# Patient Record
Sex: MALE | Race: WHITE | Employment: FULL TIME
[De-identification: names, ages, dates, MRNs, and addresses within clinical notes are randomized per-mention and may not be internally consistent; named-entity substitution may affect disease eponyms.]

---

## 2019-12-08 ENCOUNTER — HEALTH MAINTENANCE LETTER (OUTPATIENT)
Age: 34
End: 2019-12-08

## 2021-01-10 ENCOUNTER — HEALTH MAINTENANCE LETTER (OUTPATIENT)
Age: 36
End: 2021-01-10

## 2021-03-23 ENCOUNTER — AMBULATORY - HEALTHEAST (OUTPATIENT)
Dept: NURSING | Facility: CLINIC | Age: 36
End: 2021-03-23

## 2021-04-13 ENCOUNTER — AMBULATORY - HEALTHEAST (OUTPATIENT)
Dept: NURSING | Facility: CLINIC | Age: 36
End: 2021-04-13

## 2021-04-15 ENCOUNTER — HOSPITAL ENCOUNTER (EMERGENCY)
Facility: CLINIC | Age: 36
Discharge: HOME | End: 2021-04-15
Attending: PHYSICIAN ASSISTANT | Admitting: PHYSICIAN ASSISTANT
Payer: COMMERCIAL

## 2021-04-15 ENCOUNTER — OFFICE VISIT (OUTPATIENT)
Dept: INTERNAL MEDICINE | Facility: CLINIC | Age: 36
End: 2021-04-15
Payer: COMMERCIAL

## 2021-04-15 ENCOUNTER — APPOINTMENT (OUTPATIENT)
Dept: CT IMAGING | Facility: CLINIC | Age: 36
End: 2021-04-15
Attending: PHYSICIAN ASSISTANT
Payer: COMMERCIAL

## 2021-04-15 VITALS
HEART RATE: 56 BPM | BODY MASS INDEX: 27.28 KG/M2 | HEIGHT: 68 IN | RESPIRATION RATE: 18 BRPM | SYSTOLIC BLOOD PRESSURE: 136 MMHG | WEIGHT: 180 LBS | TEMPERATURE: 97 F | OXYGEN SATURATION: 99 % | DIASTOLIC BLOOD PRESSURE: 83 MMHG

## 2021-04-15 VITALS
OXYGEN SATURATION: 98 % | RESPIRATION RATE: 14 BRPM | HEART RATE: 62 BPM | WEIGHT: 186 LBS | BODY MASS INDEX: 28.19 KG/M2 | TEMPERATURE: 97.9 F | HEIGHT: 68 IN | SYSTOLIC BLOOD PRESSURE: 122 MMHG | DIASTOLIC BLOOD PRESSURE: 80 MMHG

## 2021-04-15 DIAGNOSIS — R42 LIGHTHEADEDNESS: ICD-10-CM

## 2021-04-15 DIAGNOSIS — R01.1 HEART MURMUR: ICD-10-CM

## 2021-04-15 DIAGNOSIS — R51.9 NONINTRACTABLE HEADACHE, UNSPECIFIED CHRONICITY PATTERN, UNSPECIFIED HEADACHE TYPE: Primary | ICD-10-CM

## 2021-04-15 DIAGNOSIS — R51.9 HEADACHE: ICD-10-CM

## 2021-04-15 LAB
ANION GAP SERPL CALCULATED.3IONS-SCNC: 1 MMOL/L (ref 3–14)
BASOPHILS # BLD AUTO: 0 10E9/L (ref 0–0.2)
BASOPHILS NFR BLD AUTO: 0.5 %
BUN SERPL-MCNC: 13 MG/DL (ref 7–30)
CALCIUM SERPL-MCNC: 9.2 MG/DL (ref 8.5–10.1)
CHLORIDE SERPL-SCNC: 105 MMOL/L (ref 94–109)
CO2 SERPL-SCNC: 31 MMOL/L (ref 20–32)
CREAT SERPL-MCNC: 0.87 MG/DL (ref 0.66–1.25)
DIFFERENTIAL METHOD BLD: NORMAL
EOSINOPHIL # BLD AUTO: 0.1 10E9/L (ref 0–0.7)
EOSINOPHIL NFR BLD AUTO: 1 %
ERYTHROCYTE [DISTWIDTH] IN BLOOD BY AUTOMATED COUNT: 12.8 % (ref 10–15)
GFR SERPL CREATININE-BSD FRML MDRD: >90 ML/MIN/{1.73_M2}
GLUCOSE SERPL-MCNC: 78 MG/DL (ref 70–99)
HCT VFR BLD AUTO: 41.5 % (ref 40–53)
HGB BLD-MCNC: 14.5 G/DL (ref 13.3–17.7)
IMM GRANULOCYTES # BLD: 0 10E9/L (ref 0–0.4)
IMM GRANULOCYTES NFR BLD: 0.2 %
LYMPHOCYTES # BLD AUTO: 1.2 10E9/L (ref 0.8–5.3)
LYMPHOCYTES NFR BLD AUTO: 19.3 %
MCH RBC QN AUTO: 27.8 PG (ref 26.5–33)
MCHC RBC AUTO-ENTMCNC: 34.9 G/DL (ref 31.5–36.5)
MCV RBC AUTO: 80 FL (ref 78–100)
MONOCYTES # BLD AUTO: 0.8 10E9/L (ref 0–1.3)
MONOCYTES NFR BLD AUTO: 13.8 %
NEUTROPHILS # BLD AUTO: 4 10E9/L (ref 1.6–8.3)
NEUTROPHILS NFR BLD AUTO: 65.2 %
NRBC # BLD AUTO: 0 10*3/UL
NRBC BLD AUTO-RTO: 0 /100
PLATELET # BLD AUTO: 223 10E9/L (ref 150–450)
POTASSIUM SERPL-SCNC: 4 MMOL/L (ref 3.4–5.3)
RBC # BLD AUTO: 5.22 10E12/L (ref 4.4–5.9)
SODIUM SERPL-SCNC: 137 MMOL/L (ref 133–144)
WBC # BLD AUTO: 6.1 10E9/L (ref 4–11)

## 2021-04-15 PROCEDURE — 96375 TX/PRO/DX INJ NEW DRUG ADDON: CPT

## 2021-04-15 PROCEDURE — 80048 BASIC METABOLIC PNL TOTAL CA: CPT | Performed by: PHYSICIAN ASSISTANT

## 2021-04-15 PROCEDURE — 70450 CT HEAD/BRAIN W/O DYE: CPT

## 2021-04-15 PROCEDURE — 99285 EMERGENCY DEPT VISIT HI MDM: CPT | Mod: 25

## 2021-04-15 PROCEDURE — 85025 COMPLETE CBC W/AUTO DIFF WBC: CPT | Performed by: PHYSICIAN ASSISTANT

## 2021-04-15 PROCEDURE — 250N000011 HC RX IP 250 OP 636: Performed by: PHYSICIAN ASSISTANT

## 2021-04-15 PROCEDURE — 96374 THER/PROPH/DIAG INJ IV PUSH: CPT

## 2021-04-15 PROCEDURE — 99204 OFFICE O/P NEW MOD 45 MIN: CPT | Performed by: INTERNAL MEDICINE

## 2021-04-15 PROCEDURE — 96361 HYDRATE IV INFUSION ADD-ON: CPT

## 2021-04-15 PROCEDURE — 258N000003 HC RX IP 258 OP 636: Performed by: PHYSICIAN ASSISTANT

## 2021-04-15 RX ORDER — ONDANSETRON 4 MG/1
4 TABLET, ORALLY DISINTEGRATING ORAL EVERY 8 HOURS PRN
Qty: 10 TABLET | Refills: 0 | Status: SHIPPED | OUTPATIENT
Start: 2021-04-15 | End: 2021-04-18

## 2021-04-15 RX ORDER — SODIUM CHLORIDE 9 MG/ML
INJECTION, SOLUTION INTRAVENOUS CONTINUOUS
Status: DISCONTINUED | OUTPATIENT
Start: 2021-04-15 | End: 2021-04-15 | Stop reason: HOSPADM

## 2021-04-15 RX ORDER — KETOROLAC TROMETHAMINE 15 MG/ML
15 INJECTION, SOLUTION INTRAMUSCULAR; INTRAVENOUS ONCE
Status: COMPLETED | OUTPATIENT
Start: 2021-04-15 | End: 2021-04-15

## 2021-04-15 RX ORDER — DEXAMETHASONE SODIUM PHOSPHATE 10 MG/ML
10 INJECTION, SOLUTION INTRAMUSCULAR; INTRAVENOUS ONCE
Status: COMPLETED | OUTPATIENT
Start: 2021-04-15 | End: 2021-04-15

## 2021-04-15 RX ORDER — DIPHENHYDRAMINE HYDROCHLORIDE 50 MG/ML
25 INJECTION INTRAMUSCULAR; INTRAVENOUS ONCE
Status: COMPLETED | OUTPATIENT
Start: 2021-04-15 | End: 2021-04-15

## 2021-04-15 RX ORDER — METOCLOPRAMIDE HYDROCHLORIDE 5 MG/ML
10 INJECTION INTRAMUSCULAR; INTRAVENOUS ONCE
Status: COMPLETED | OUTPATIENT
Start: 2021-04-15 | End: 2021-04-15

## 2021-04-15 RX ADMIN — SODIUM CHLORIDE 1000 ML: 9 INJECTION, SOLUTION INTRAVENOUS at 11:38

## 2021-04-15 RX ADMIN — KETOROLAC TROMETHAMINE 15 MG: 15 INJECTION, SOLUTION INTRAMUSCULAR; INTRAVENOUS at 11:38

## 2021-04-15 RX ADMIN — METOCLOPRAMIDE 10 MG: 5 INJECTION, SOLUTION INTRAMUSCULAR; INTRAVENOUS at 11:38

## 2021-04-15 RX ADMIN — DEXAMETHASONE SODIUM PHOSPHATE 10 MG: 10 INJECTION, SOLUTION INTRAMUSCULAR; INTRAVENOUS at 11:38

## 2021-04-15 RX ADMIN — DIPHENHYDRAMINE HYDROCHLORIDE 25 MG: 50 INJECTION, SOLUTION INTRAMUSCULAR; INTRAVENOUS at 11:38

## 2021-04-15 ASSESSMENT — ENCOUNTER SYMPTOMS
CHILLS: 1
LIGHT-HEADEDNESS: 1
DIZZINESS: 1
NAUSEA: 1

## 2021-04-15 ASSESSMENT — MIFFLIN-ST. JEOR
SCORE: 1735.97
SCORE: 1763.19

## 2021-04-15 NOTE — ED TRIAGE NOTES
Sudden headache on Sunday, laid down and the next day was lingering and was dizzy. Headache continuing

## 2021-04-15 NOTE — ED PROVIDER NOTES
"  History   Chief Complaint:  Headache       HPI   Denton Guzman is a 35 year old male who presents with a headache. The patient reports that he began to experience a throbbing headaches at about 0200 last Sunday after looking at emails on his phone. It started in the back of his head and progressed laterally to his temples. The sensation was still lingering when he woke up in the morning, as he felt dizzy on his way to work. He was feeling better yesterday morning, but pounding in his head resumed after he returned from work. Symptoms were still present this morning, prompting his visit to the ED. He describes the dizzy feeling as lightheadedness, adding that the room is not \"spinning.\" This is the worst headache he has ever experienced. Ibuprofen was taken at 0500 this morning (3 pills). Cold sweats and nausea are endorsed during the headache episodes. The patient denies changes in vision, vomiting, chest pain, shortness of breath, cough, fever, trauma to the head, history of migraines, and any daily medications.    Review of Systems   Constitutional: Positive for chills.   Eyes: Negative for visual disturbance.   Gastrointestinal: Positive for nausea.   Neurological: Positive for dizziness and light-headedness.   All other systems reviewed and are negative.    Allergies:  Sulfa drugs    Medications:  No reported current medications    Past Medical History:    No reported past medical history    Past Surgical History:    Arthroscopy, knee  Open reduction internal fixation wrist    Family History:    No reported family history    Social History:  Accompanied by spouse in ED      Physical Exam     Patient Vitals for the past 24 hrs:   BP Temp Temp src Pulse Resp SpO2 Height Weight   04/15/21 1300 136/83 -- -- 56 -- 99 % -- --   04/15/21 1230 131/74 -- -- 55 -- 97 % -- --   04/15/21 1221 -- -- -- -- -- 98 % -- --   04/15/21 1220 134/69 -- -- 51 -- -- -- --   04/15/21 0952 (!) 155/82 97  F (36.1  C) Temporal 53 18 98 " "% 1.727 m (5' 8\") 81.6 kg (180 lb)       Physical Exam  Constitutional: Pleasant. Cooperative.  Eyes: Pupils equally round and reactive  HENT: Head is normal in appearance. Oropharynx is normal with moist mucus membranes.  Cardiovascular: Regular rate and rhythm and without murmurs.  Respiratory: Normal respiratory effort, lungs are clear bilaterally.  Skin: Normal, without rash.  Neurologic: Cranial nerves II-XII intact, nl cognition, no focal deficits. Alert and oriented x 3. Normal  strength. Normal leg raise. Sensation to light touch intact throughout all 4 extremities. 5/5 strength with dorsiflexion and plantarflexion bilaterally. No pronator drift. Normal finger nose finger. No meningeal signs.  Psychiatric: Normal affect.  Nursing notes and vital signs reviewed.    Emergency Department Course     Imaging:  CT Head w/o Contrast  No evidence of acute intracranial hemorrhage, mass, or  herniation.     CORDELL MCNEILL MD    Read per radiology    Laboratory:  CBC: WBC 6.1, HGB 14.5,   BMP: Anion gap 1 (L) o/w WNL (Creatinine 0.87)     Emergency Department Course:  Reviewed:  I reviewed nursing notes, vitals and past medical history    Assessments:  1036 I obtained history and examined the patient as noted above.   1259 I rechecked the patient and explained findings. All questions answered.     Interventions:  1138 Normal Saline 1000 mL IV  1138 Reglan 10 mg IV  1138 Benadryl 25 mg IV  1138 Decadron 10 mg IV  1138 Toradol 15 mg IV    Disposition:  The patient was discharged to home.       Impression & Plan     Medical Decision Making:  Denton Guzman is a 35 year old male who presents to the ED for evaluation of a headache.  Patient sent into the ED for further evaluation given persistent headache.  Patient denies history of headaches, nor worst ever headache.  See HPI as above for additional details.  Vitals and physical exam as above.  Differential was broad and included meningitis, SAH, ICH, migraine, " tension headache, acute glaucoma, among others.  Given new onset headache without history, CT obtained without evidence for intracranial abnormality.  Baseline labs obtained returned reassuring as above.  No meningeal signs or leukocytosis to suggest for meningitis.  Patient provided the above interventions with complete resolution of his headache.  Suspect atypical migraine at this time.  Advised follow-up with PCP with recurrent headaches.  Advised Tylenol and ibuprofen for recurrent headaches, will send home with Zofran. Discussed reasons to return. All questions answered. Patient discharged to home in stable condition.    Diagnosis:    ICD-10-CM    1. Headache  R51.9    2. Lightheadedness  R42        Discharge Medications:  Discharge Medication List as of 4/15/2021  1:04 PM      START taking these medications    Details   ondansetron (ZOFRAN ODT) 4 MG ODT tab Take 1 tablet (4 mg) by mouth every 8 hours as needed for nausea, Disp-10 tablet, R-0, Local Print             Scribe Disclosure:  I, Jc Quintero, am serving as a scribe at 10:26 AM on 4/15/2021 to document services personally performed by Hunter Schmid PA-C based on my observations and the provider's statements to me.     This record was created at least in part using electronic voice recognition software, so please excuse any typographical errors.           Hunter Schmid PA-C  04/15/21 [id number]

## 2021-04-15 NOTE — PROGRESS NOTES
"    Assessment & Plan     Nonintractable headache, unspecified chronicity pattern, unspecified headache type  This patient has not had a prior history of headaches in the past.  He has no personal history of migraine headaches or tension headaches and has now developed the worst headache of his life roughly 3 or 4 days ago with ongoing lingering symptoms inclusive of dizziness fatigue and blurred vision.  I discussed with the patient my concern in regards to his ongoing symptoms and complaints and thus have suggested he may be best suited to be seen in the emergency room for more of an acute assessment with imaging.  He is agreeable.  I discussed with him transfer and he will be transferred by his wife which I feel is appropriate at this point.  I contacted the emergency room and inform them of the patient's arrival, [facility] ER    Heart murmur  ?flow component, suggest echocardiogram  - Echocardiogram Complete; Future       BMI:   Estimated body mass index is 28.28 kg/m  as calculated from the following:    Height as of this encounter: 1.727 m (5' 8\").    Weight as of this encounter: 84.4 kg (186 lb).       See Patient Instructions    Return today (on 4/15/2021) for transfer to ER.    Kev jA MD  [facility]    Nehemias Chawla is a 35 year old who presents for the following health issues     New Patient     HPI     This patient states he has never had a prior history of headaches.  He does not define any ongoing issues of recurrent tension or migraine headaches.  He has had no trauma or distinct recent illness.  He was well until this last weekend when he developed a severe headache that has lingered since.  He describes his headache is being the \"worst headache of his life.\"  He defines it as a pounding sensation in the back of his head.  His headache is still persistent as of today although slightly improved with the exception of the fact that " he feels unsteady and dizzy.  He cannot identify any precipitating event although he did state that he had a little diarrhea the day that his headache started.  He also has multiple tattoos and noted about a month ago he had another tattoo to his lower leg and wondered if this may have been a lingering impact.    He defines a stiff neck,  some ongoing nausea, dizziness and some generalized weakness.  Does not describe any double vision but states he feels his vision is slightly more difficult than normal.    Headache:    Onset/Duration:  Episodes of more severe headaches on Sunday and Wednesday night. Lingering constant dull headache since Monday.     Description  Location: back of head    Character: Pounding/ pulsating sensation in the back of the head followed by lingering dull headache   Frequency:  2 episodes  Duration: Pounding sensation lasted hours. Dull pain constantly since   Wake with headaches: YES- woke up Monday morning with dull headache in back of head still   Able to do daily activities when headache present: no   Intensity:  severe  Progression of Symptoms:  intermittent  Accompanying signs and symptoms:  Stiff neck: YES  Neck or upper back pain: YES- neck pain   Sinus or URI symptoms YES- slightly runny nose   Fever: no  Nausea or vomiting: YES- nausea   Dizziness: YES- constant since Sunday   Numbness/tingling: no  Weakness: YES- generalized   Visual changes: vision seems not as clear as normal, harder to focus   History  Head trauma: no  Family history of migraines: YES- mother   Daily pain medication use: no  Previous tests for headaches: no  Neurologist evaluation: no  Precipitating or Alleviating factors (light/sound/sleep/caffeine): both episodes began while patients head was tilted down looking at cell phone   Therapies tried and outcome: Ibuprofen (Advil, Motrin)              Outcome - not effective  Frequent/daily pain medication use: no    Review of Systems     ENT/MOUTH: NEGATIVE for  "ear, mouth and throat problems  RESP: NEGATIVE for significant cough or SOB  CV: NEGATIVE for chest pain, palpitations or peripheral edema  GI: NEGATIVE for nausea, abdominal pain, heartburn, or change in bowel habits  : NEGATIVE for frequency, dysuria, or hematuria  MUSCULOSKELETAL: NEGATIVE for significant arthralgias or myalgia  HEME: NEGATIVE for bleeding problems  PSYCHIATRIC: NEGATIVE for changes in mood or affect      Objective    /80   Pulse 62   Temp 97.9  F (36.6  C) (Temporal)   Resp 14   Ht 1.727 m (5' 8\")   Wt 84.4 kg (186 lb)   SpO2 98%   BMI 28.28 kg/m    Body mass index is 28.28 kg/m .  Physical Exam   GENERAL: He appears otherwise healthy but he is complaining of feeling slightly dizzy.  EYES: Eyes grossly normal to inspection, PERRL and conjunctivae and sclerae normal  HENT: ear canals and TM's normal, nose and mouth without ulcers or lesions  NECK: no adenopathy, no asymmetry, masses, or scars and thyroid normal to palpation  RESP: lungs clear to auscultation - no rales, rhonchi or wheezes  CV: regular rate and rhythm, normal S1 S2, no S3 or S4, soft murmur, ?flow murmur, no click or rub  ABDOMEN: soft, nontender, no hepatosplenomegaly, no masses and bowel sounds normal  SKIN: Demonstrates multiple tattoos upper torso and lower.  NEURO: There is some mild photophobia.  Pupils otherwise reactive.  There is no obvious or distinct nystagmus.  Cranial nerves II through XII are otherwise intact.  His gait is stable.  His strength appears grossly normal and symmetric.  No distinct focal changes are noted.  There is some mild subjective tightness noted to his neck.  PSYCH: mentation appears normal, affect normal/bright          "

## 2021-05-10 ENCOUNTER — HOSPITAL ENCOUNTER (OUTPATIENT)
Dept: CARDIOLOGY | Facility: CLINIC | Age: 36
Discharge: HOME | End: 2021-05-10
Attending: INTERNAL MEDICINE | Admitting: INTERNAL MEDICINE
Payer: COMMERCIAL

## 2021-05-10 DIAGNOSIS — R01.1 HEART MURMUR: ICD-10-CM

## 2021-05-10 PROCEDURE — 93306 TTE W/DOPPLER COMPLETE: CPT

## 2021-05-10 PROCEDURE — 93306 TTE W/DOPPLER COMPLETE: CPT | Mod: 26 | Performed by: INTERNAL MEDICINE

## 2021-10-23 ENCOUNTER — HEALTH MAINTENANCE LETTER (OUTPATIENT)
Age: 36
End: 2021-10-23

## 2022-02-12 ENCOUNTER — HEALTH MAINTENANCE LETTER (OUTPATIENT)
Age: 37
End: 2022-02-12

## 2022-10-10 ENCOUNTER — HEALTH MAINTENANCE LETTER (OUTPATIENT)
Age: 37
End: 2022-10-10

## 2023-02-18 ENCOUNTER — HEALTH MAINTENANCE LETTER (OUTPATIENT)
Age: 38
End: 2023-02-18

## 2024-03-16 ENCOUNTER — HEALTH MAINTENANCE LETTER (OUTPATIENT)
Age: 39
End: 2024-03-16